# Patient Record
Sex: FEMALE | Race: WHITE | NOT HISPANIC OR LATINO | Employment: FULL TIME
[De-identification: names, ages, dates, MRNs, and addresses within clinical notes are randomized per-mention and may not be internally consistent; named-entity substitution may affect disease eponyms.]

---

## 2024-08-13 ENCOUNTER — TRANSCRIBE ORDERS (OUTPATIENT)
Dept: OTHER | Age: 44
End: 2024-08-13

## 2024-08-13 DIAGNOSIS — D48.60 PHYLLODES TUMOR OF BREAST: Primary | ICD-10-CM

## 2024-08-14 ENCOUNTER — PATIENT OUTREACH (OUTPATIENT)
Dept: ONCOLOGY | Facility: CLINIC | Age: 44
End: 2024-08-14
Payer: COMMERCIAL

## 2024-08-14 NOTE — PROGRESS NOTES
New Patient Oncology Nurse Navigator Note     Referring provider: Xenia Cifuentes MD      Referring Clinic/Organization: [facility]      Referred to (specialty:) Medical Oncology     Requested provider (if applicable): Dr. 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 Bilateral diagnostic mammograms were performed on 5/10/24   Right: New irregular, mixed density mass upper outer quadrant of the right breast at middle and posterior depth. Mammographically, the mass measures 6.6 x 5.4 x 5.8 cm. This contains heterogeneously dense portions of the tissue, but also is interspersed with areas of fat.   Ultrasound pursued.   Left: There is no evidence for spiculated masses, architectural  distortion, asymmetry or suspicious calcifications.   Ultrasound evaluation of the right breast was performed. Palpable abnormality corresponds with a mixed echogenicity mass 9-10:00 position at 6 cm from the nipple. Sonographically this measures 6.1 x 3.1 x 4.1 cm. There are solid components as well as mixed cystic/solid components. Posteriorly within the mass there are echogenic areas which likely represent areas of intralesional fat.   IMPRESSION: Highly suspicious palpable right breast mass, measuring up to 6.6 cm.     5/13/24 - Case: [id number]     A) RIGHT BREAST, 9:00, 6 CM FROM NIPPLE, ULTRASOUND-GUIDED CORE BIOPSY:  1. Phyllodes tumor, at least borderline, with features concerning for malignancy  2. See comment   Comment A) Microscopic review shows a fibroepithelial lesion that displays the following: stromal overgrowth (in relationship to the glandular elements), stromal hypercellularity, stromal atypia, and an atypical lipomatous  "component. These findings are classified at least as a borderline phyllodes tumor (low-grade malignant) and raise the possibility of malignancy.    Phyllodes tumors that are classified as \"borderline type\" can recur (14-25% risk of recurrence), and rarely have the potential to metastasize. Thus, complete excision with an adequate margin is the recommended treatment.    This is an image-guided breast biopsy. The pathologic findings should be correlated with radiologic and clinical findings prior to treatment decisions.    She met with Dr. Shavonne Lott for surgery consult.    6/4/24 - Bilateral breast MRI  IMPRESSION:   1.  Biopsy-proven phyllodes tumor in the right breast measures up to 7.0 cm (on ultrasound this measured up to 6.1 cm). No suspicious enhancement of the overlying skin or the underlying pectoralis muscle.   2.  No MRI evidence of malignancy in the left breast.   3.  No suspicious lymph nodes.   ACR BI-RADS Category: 4 - Suspicious Abnormality.   RECOMMENDATION: Surgical excision    7/23/24 - Case: [id number]             A) RIGHT BREAST, LUMPECTOMY:   1. Malignant phyllodes tumor, measuring 78 mm (see comment)       a. Margins:          - Less than 1 mm from the anterior, posterior, inferior, superior and medial margins          - See parts B-G for final margin status       b. Biopsy clip identified   2. See synoptic staging summary below     B) RIGHT BREAST, ANTERIOR MARGIN, EXCISION:   1. Benign breast tissue   2. Negative for atypia and malignancy (margin negative)     C) RIGHT BREAST, POSTERIOR MARGIN, EXCISION:   1. Benign breast tissue   2. Focal benign skeletal muscle   3. Negative for atypia and malignancy (margin negative)     D) RIGHT BREAST, MEDIAL MARGIN, EXCISION:   1. Benign breast tissue   2. Negative for atypia and malignancy (margin negative)     E) RIGHT BREAST, LATERAL MARGIN, EXCISION:   1. Benign breast tissue   2. Focal benign skeletal muscle   3. Negative for atypia and " "malignancy (margin negative)     F) RIGHT BREAST, INFERIOR MARGIN, EXCISION:   1. Benign breast tissue   2. Negative for atypia and malignancy (margin negative)     G) RIGHT BREAST, SUPERIOR MARGIN, EXCISION:   1. Benign breast tissue   2. Negative for atypia and malignancy (margin negative)     Comment:  A) Microscopic review shows a fibroepithelial lesion that marked stromal cellularity, severe nuclear atypia, stromal overgrowth, infiltrative border, areas of leaf-like architecture, numerous mitoses and a malignant heterologous (liposarcoma) component. Overall, these findings are consistent with a malignant phyllodes tumor.     Phyllodes tumors that are classified as \"malignant type\" can recur (23-30% risk of recurrence), and have the potential to metastasize. Thus, complete excision is the recommended treatment (in the current case, all final margins are negative; the final posterior margin specimen (part C) was 8 mm thick and would be the closest final margin. All remaining final margins are at least 10 mm away.). An ideal margin width for malignant phyllodes tumors remains to be determined (and should be considered in relation to factors such as tumor size and cosmesis). Although some studies have found increased incidence of recurrence in patients with margins less than 10 mm, other studies have not shown that the margin width confers local control advantage.     8/5/24 - Case: [id number]                                   A) BREAST, RIGHT, ORIENTED ADDITIONAL POSTERIOR MARGIN, RE-EXCISION:  1. Breast tissue and skeletal muscle with reactive changes including fat necrosis, inflammation and fibrosis  2. Negative for phyllodes tumor  3. See comment   Comment With this additional posterior margin re-excision, all final margins are negative for phyllodes tumor by > 10 mm.     Jyoti had a consultation with rad onc and thinking about radiation. [facility]  RADIATION ONCOLOGY CONSULT AND PROGRESS NOTES - Scan not " transferring to Media?    Patient resides in [place], [place].    8/14 1328 - Telephoned and left voice message for patient requesting call to New Patient Scheduling to assist in scheduling consult as referred by Dr. Cifuentes. Writer received referral, reviewed for appropriate plan, and referral transferred to New Patient Scheduling for completion.    8/19 1515 - Telephoned and spoke with Jyoti. Explained my role and purpose for the call. Writer received referral, reviewed for appropriate plan, and call warm transferred to New Patient Scheduling for completion.    Records Location: Care Everywhere, Media, and See Bookmarked material     Records Needed:  Path reports-biopsy and surgery (per protocol)  Pathology reviews (per protocol)  Breast imaging for past 5 years  Rad notes, OP note, surgeons note (per protocol)

## 2024-08-22 NOTE — PROGRESS NOTES
Oncology Consultation:  Date on this visit: 8/26/2024    Jyoti Jurado is referred by Dr.Sarah KATELYN Cifuentes for an oncology consultation. She requires evaluation for new diagnosis of phyllodes tumor of the right breast.    Primary Physician: Shirin Carter     History Of Present Illness:  Ms. Jurado is a 44 year old female with right breast malignant phyllodes tumor.  She first palpated a small mass in her right breast when weaning her son from breastfeeding in 2020.  She had breast imaging in 05/2023 which showed dense breast tissue, but no suspicious findings.  Between 05/2023 and 05/2024, she noted the mass significantly increased in size.  She also developed numbness of the area over the mass.  Bilateral diagnostic mammograms on 5/10/2024 showed a large mass in the upper outer right breast.  Ultrasound showed the mass at 9-10 o'clock, 6 cm from the nipple to measure up to 6.1 cm.  Biopsy on 5/13/2024 was consistent with a phyllodes tumor with features suspicious for malignancy (stromal overgrowth, hypercellularity, atypia, and an atypical lipomatous component).  She has seen Dr. Shavonne Lott in surgical consultation.  A breast MRI showed the right breast phyllodes tumor to measure up to 7 cm.  There were no other suspicious breast lesions or lymphadenopathy.    She underwent a right breast lumpectomy on 7/23/2024.  Pathology was consistent with malignant phyllodes tumor measuring 7.8 cm.  All initial surgical margins were close at < 1 mm, patient intraoperatively underwent re-excision of anterior, posterior, medial, lateral, inferior, and superior margins which were all negative for residual disease.  The final closest margin, the posterior margin, was 8 mm.  She underwent re-excision of this margin on 8/5/2024 which was negative for residual disease.  All final margins were therefore negative by >10 mm.  Patient was seen by [facility] and is considering radiation.    Ms. Jurado today  "presents for a second opinion regarding adjuvant therapies for malignant Phyllodes tumor of the right breast. She notes surgery went okay.  She reports having had a lot of output from her right mastectomy drain initially, but this tapered off and she was able to have the drain removed 1 week ago.  Since having the drain removed, there is still a small amount of fluid intermittently draining from the drain insertion site.  She notes she can feel some swelling under the skin in this area as well.  No redness, warmth, fever, or chills.  She has ongoing right chest wall discomfort.  She is weaning off of ibuprofen.  In addition, she has had random \"firing\" pains for which she is taking amitriptyline and gabapentin.  She notes prior to surgery she had left shoulder capsulitis, post-surgery the right shoulder is also bothering her.  She is to return to work as a  tomorrow and is uncertain if she is ready to do so.    She is otherwise in relatively good health.  She reports chronic IBS with constipation.  She has a history of depression and anxiety for which she takes buspirone and escitalopram.  She feels mood has been stable on these medications.  She has menorrhagia, which is controlled with an IUD.  Outside of the bilateral shoulder capsulitis and right chest wall pain, she denies new bone or joint aches or pains.  She has no cough or shortness of breath.      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 Blood Product orders may be delayed.  Draw one red top and two purple top tubes for ALL Type and Screen/ Type and Crossmatch orders.    Tetracyclines & Related Other (See Comments)     recurrent yeast infections    Other " Reaction(s): Yeast Infection      recurrent yeast infections    Anesthetics, Amide Rash     Swelling also    Latex Dermatitis, Itching, Rash and Swelling     Swelling also    Paroxetine Anxiety, Confusion, Dizziness, Other (See Comments), Palpitations and Rash     Other Reaction(s): Lightheadedness    Manic type symptoms, increased mood swings    Povidone-Iodine Dermatitis, Itching, Rash and Swelling        Current Medications:    Current Outpatient Medications:     amitriptyline (ELAVIL) 10 MG tablet, Take 1 tablet by mouth daily., Disp: , Rfl:     busPIRone (BUSPAR) 10 MG tablet, Take 10 mg by mouth 2 times daily., Disp: , Rfl:     Cholecalciferol (D 1000) 25 MCG (1000 UT) CAPS, Take by mouth daily., Disp: , Rfl:     escitalopram (LEXAPRO) 20 MG tablet, Take 20 mg by mouth daily., Disp: , Rfl:     gabapentin (NEURONTIN) 100 MG capsule, Take 100 mg by mouth 3 times daily., Disp: , Rfl:     levonorgestrel (MIRENA) 52 MG (20 mcg/day) IUD, 1 each by Intrauterine route., Disp: , Rfl:     magnesium oxide 400 MG CAPS, Take 400 mg by mouth daily., Disp: , Rfl:     Melatonin 10 MG TABS tablet, Take 10 mg by mouth nightly as needed., Disp: , Rfl:     multivitamin w/minerals (MULTI-VITAMIN) tablet, Take 1 tablet by mouth daily., Disp: , Rfl:     mupirocin (BACTROBAN) 2 % external ointment, Apply topically daily., Disp: , Rfl:     thyroid (ARMOUR) 60 MG tablet, Take 60 mg by mouth daily., Disp: , Rfl:       Family History:  Paternal aunt with breast cancer at 51 yo.  Paternal first cousin (daughter of aforementioned aunt) with breast cancer at 47 yo.  Grandfather with prostate cancer at 81 yo.  Great aunt with breast cancer in her 50s.  Patient states she had germline genetic testing performed through [facility] which was negative for pathogenic mutation.    Social History:  .  Has 2 children, ages 10 and 7 years old.  She states one of her children has a learning disability and the other in on the autism spectrum.  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  She is a nonsmoker.  Occasional alcohol use.    Physical Exam:  /85 (BP Location: Right arm, Patient Position: Sitting, Cuff Size: Adult Regular)   Pulse 74   Temp 98.6  F (37  C) (Oral)   Resp 14   Ht 1.642 m (5' 4.65\")   Wt 82.9 kg (182 lb 12.8 oz)   SpO2 97%   BMI 30.75 kg/m    General:  Well appearing adult female in NAD.  HEENT:  Normocephalic.  Sclera anicteric.  MMM.    Lymph:  No palpable cervical, supraclavicular, or axillary LAD.  Chest:  CTA bilaterally.  No wheezes or crackles.  CV:  RRR.  Nl S1 and S2.  No audible m/r/g.  Breast:  Bilateral breasts are of increased fibroglandular density. Right breast upper outer lumpectomy.  There is skin thickening anterolateral right chest wall in the area between the lumpectomy incision and the drain site.  No fluctuant fluid.  No masses or nodularity of the either breast.  Abd:  Soft/ND.    Ext:  No pitting edema of the bilateral lower extremities.    Neuro:  Cranial nerves grossly intact.  Psych:  Mood and affect appear normal.      Laboratory/Imaging Studies  I personally reviewed the below images and laboratories while in clinic today:    5/10/2024 Bilateral diagnostic mammograms and right breast ultrasound:  BILATERAL Digital Diagnostic Mammogram     FINDINGS: A diagnostic bilateral mammogram was performed utilizing   tomosynthesis.     Right: New irregular, mixed density mass upper outer quadrant of the   right breast at middle and posterior depth. Mammographically, the   mass measures 6.6 x 5.4 x 5.8 cm. This contains heterogeneously dense   portions of the tissue, but also is interspersed with areas of fat.   Ultrasound pursued.     Left: There is no evidence for spiculated masses, architectural   distortion, asymmetry or suspicious calcifications.     When performed, computer-aided detection was used in the   interpretation of this study.       RIGHT Breast Ultrasound     FINDINGS: " Ultrasound evaluation of the right breast was performed.   Palpable abnormality corresponds with a mixed echogenicity mass   9-10:00 position at 6 cm from the nipple. Sonographically this   measures 6.1 x 3.1 x 4.1 cm. There are solid components as well as   mixed cystic/solid components. Posteriorly within the mass there are   echogenic areas which likely represent areas of intralesional fat.     IMPRESSION: Highly suspicious palpable right breast mass, measuring   up to 6.6 cm.     5/13/2024 Right breast biopsy:  Final Diagnosis A) RIGHT BREAST, 9:00, 6 CM FROM NIPPLE, ULTRASOUND-GUIDED CORE BIOPSY:  1. Phyllodes tumor, at least borderline, with features concerning for malignancy  2. See comment   Electronically signed by Jeanne Schrader MD for Maria G Narayan MD on 5/15/2024 at 11:39 AM   Comment A) Microscopic review shows a fibroepithelial lesion that displays the following: stromal overgrowth (in relationship to the glandular elements), stromal hypercellularity, stromal atypia, and an atypical lipomatous component. These findings are classified at least as a borderline phyllodes tumor (low-grade malignant) and raise the possibility of malignancy.     6/4/2024 Breast MRI:  FINDINGS:   Breast composition: Heterogeneous fibroglandular tissue   Background parenchymal enhancement: Minimal       Right Breast:   In the upper outer quadrant and extending into the slightly lower   outer quadrant, middle and posterior depths there is an irregular   mass with circumscribed margins which corresponds with the   biopsy-proven phyllodes tumor. The mass measures 7.0 x 5.0 x 7.0 cm   (AP by TV by CC dimensions measured on axial subtraction 100: Image   58 and sagittal 101:69). This is predominantly solid and   heterogeneously enhancing. Much of the mass is T2 hyperintense and   avidly enhances. A small portion anteriorly is T2 and T1 hyperintense   and does not enhance, probably represents  proteinaceous/hemorrhagic   fluid. The mass broadly abuts the pectoralis muscle without findings   to suggest muscle invasion. A thin layer (less than 1 cm in   thickness) of normal fat is preserved between the mass and overlying   skin of the lateral breast, no suspicious enhancement of the   overlying skin.     No suspicious enhancement elsewhere in the right breast.     No right axillary or internal mammary chain adenopathy. No chest wall   signal abnormalities.       Left Breast:   No concerning areas of enhancement or suspicious masses.     No left axillary or internal mammary chain adenopathy. No chest wall   signal abnormalities.     7/23/2024 Pathology right breast lumpectomy:  Final Diagnosis A) RIGHT BREAST, LUMPECTOMY:  1. Malignant phyllodes tumor, measuring 78 mm (see comment)      a. Margins:         - Less than 1 mm from the anterior, posterior, inferior, superior and medial margins         - See parts B-G for final margin status      b. Biopsy clip identified  2. See synoptic staging summary below    B) RIGHT BREAST, ANTERIOR MARGIN, EXCISION:  1. Benign breast tissue  2. Negative for atypia and malignancy (margin negative)    C) RIGHT BREAST, POSTERIOR MARGIN, EXCISION:  1. Benign breast tissue  2. Focal benign skeletal muscle  3. Negative for atypia and malignancy (margin negative)    D) RIGHT BREAST, MEDIAL MARGIN, EXCISION:  1. Benign breast tissue  2. Negative for atypia and malignancy (margin negative)    E) RIGHT BREAST, LATERAL MARGIN, EXCISION:  1. Benign breast tissue  2. Focal benign skeletal muscle  3. Negative for atypia and malignancy (margin negative)    F) RIGHT BREAST, INFERIOR MARGIN, EXCISION:  1. Benign breast tissue  2. Negative for atypia and malignancy (margin negative)    G) RIGHT BREAST, SUPERIOR MARGIN, EXCISION:  1. Benign breast tissue  2. Negative for atypia and malignancy (margin negative)   Electronically signed by Angie Luna MD on 7/26/2024 at  4:37 PM  "  Comment A) Microscopic review shows a fibroepithelial lesion that marked stromal cellularity, severe nuclear atypia, stromal overgrowth, infiltrative border, areas of leaf-like architecture, numerous mitoses and a malignant heterologous (liposarcoma) component. Overall, these findings are consistent with a malignant phyllodes tumor.    Phyllodes tumors that are classified as \"malignant type\" can recur (23-30% risk of recurrence), and have the potential to metastasize. Thus, complete excision is the recommended treatment (in the current case, all final margins are negative; the final posterior margin specimen (part C) was 8 mm thick and would be the closest final margin. All remaining final margins are at least 10 mm away.). An ideal margin width for malignant phyllodes tumors remains to be determined (and should be considered in relation to factors such as tumor size and cosmesis). Although some studies have found increased incidence of recurrence in patients with margins less than 10 mm, other studies have not shown that the margin width confers local control advantage.     A) Immunohistochemical stains were performed on blocks A12 and A14 with appropriate controls and show the following results, supporting the diagnosis:   Block A12:       CKMNF-116: Negative       CKAE1/AE3: Negative       CKCocktail: Negative       Melan-A: Negative       HMB-45: Negative       SMA: Negative       Desmin: Negative       S-100: Positive in lipomatous component       ERG: Negative (highlights vessels)   Block A14:       CD34: Negative (highlights vessels)       CD10: Negative       SMA: Negative     SYNOPTIC REPORTING PHYLLODES OF THE BREAST: Resection  PHYLLODES OF THE BREAST: RESECTION - All Specimens  8th Edition - Protocol posted: 9/21/2022    SPECIMEN     Procedure:    Excision (less than total mastectomy)     Specimen Laterality:    Right    TUMOR     Tumor Site:    Clock position     :    9 o'clock   Tumor Site:    " Distance from nipple (Centimeters): 6 cm   Tumor Size:    Greatest dimension (Millimeters): 78 mm   Histologic Type:    Phyllodes tumor, malignant   Stromal Cellularity:    Marked   Stromal Atypia:    Marked   Stromal Overgrowth:    Present   Mitotic Rate:    > 100 mitoses per 10 HPFs   Histologic Tumor Border:    Infiltrative     :    Extensive   Malignant Heterologous Elements:    Liposarcoma (excluding well-differentiated liposarcoma)    MARGINS   Margin Status for Phyllodes Tumor:    All margins negative for phyllodes tumor     Closest Margin(s) to Phyllodes Tumor:    Posterior     Distance from Phyllodes Tumor to Closest Margin:    At least 8 mm    REGIONAL LYMPH NODES   Regional Lymph Node Status:    Not applicable (no regional lymph nodes submitted or found)       Staging applies only to malignant phyllodes tumors. pT and pN categories should not be assigned for benign and borderline tumors.   PATHOLOGIC STAGE CLASSIFICATION (pTNM, AJCC 8th Edition):    Tumor is malignant     pT Category:    pT2     pN Category:    pN not assigned (no nodes submitted or found)     Comment(s):    Block(s) for potential future ancillary testing: A12     8/5/2024 Right breast, posterior margin re-excision:  Final Diagnosis A) BREAST, RIGHT, ORIENTED ADDITIONAL POSTERIOR MARGIN, RE-EXCISION:  1. Breast tissue and skeletal muscle with reactive changes including fat necrosis, inflammation and fibrosis  2. Negative for phyllodes tumor  3. See comment [facility]   Electronically signed by Abrhaam Stokes MD on 8/8/2024 at  2:12 PM    Comment With this additional posterior margin re-excision, all final margins are negative for phyllodes tumor by > 10 mm.      8/6/2024 CT chest w/ and w/o contrast:  FINDINGS:   LUNGS AND PLEURA: Small stable calcification posterior lateral right   lower lobe may represent a granuloma or a small area of vascular   calcification. The lungs are otherwise clear. No  pleural effusion.     MEDIASTINUM/AXILLAE: Postoperative changes right breast with   percutaneous surgical drain in place. There is some overlying skin   thickening and mild haziness of the fat along the operative bed. No   defined fluid collections are evident. No lymphadenopathy. No   significant pericardial fluid. Normal caliber thoracic aorta and   esophagus.     CORONARY ARTERY CALCIFICATION: None.     UPPER ABDOMEN: Food material in the stomach, likely nonfasting at the   time of this exam. No significant findings otherwise identified.     MUSCULOSKELETAL: No significant osseous pathology.       ASSESSMENT/PLAN:  Ms. 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  She is s/p surgical excision with wide surgical margins.  She has met in consultation with radiation oncology.  She comes into clinic today for a second opinion regarding adjuvant treatment options.    We discussed her diagnosis of malignant Phyllodes tumor of the right breast.  We discussed Phyllodes tumor of the breast is not a breast cancer, but rather a sarcoma as it arises from the connective tissue of the breast.  Like other sarcomas, Phyllodes tumors have a high risk of local recurrence.  In other words, there is a high risk of recurrence at the initial site of presentation.  Due to this, the mainstay of treatment of malignant Phyllodes tumor of the breast is excision with wide surgical margins of >10 mm.  She is s/p right breast lumpectomy and re-excision of margins, with all final margins >10 mm.  Therefore no further surgery is indicated at this point.    We next reviewed adjuvant therapy treatment options.  National Comprehensive Cancer Network guidelines recommend consideration of adjuvant radiation therapy in patients considered at high risk of recurrence.  High risk of recurrence features in this patient include tumor size >5 cm and high mitotic index.  I therefore  would consider adjuvant radiation in this case, however, would also like to discuss this with my radiation oncology colleagues and so have added on Ms. Jurado's case for discussion at the [facility] tumor board on Friday, 8/30/2024.      We discussed there is no literature to support a benefit of adjuvant chemotherapy in the treatment of malignant Phyllodes tumor of the breast.  Given the high mitotic index of the Phyllodes tumor in this case, I discussed this case with my sarcoma expert colleagues, Dr. Major and Dr. Rossi.  Both agreed that they also would not recommend adjuvant chemotherapy given no evidence for benefit and high toxicity to a treatment regimen such as Doxil and ifosfamide.    We then discussed surveillance plan.  Given her young age, family history of breast cancer, and dense breast tissue, I recommend high risk screening of her breasts with both an annual mammogram w/ tomosynthesis as well as an annual breast MRI.  These studies can be spaced so that she is having some form of breast imaging once every 6 months.  In addition, given the most common site of metastasis from malignant Phyllodes tumor of the breast is the lungs, I recommend a CT of the chest every 6-12 months for the next 5 years.    Ms. Jurado had multiple questions that were answered to her satisfaction today.  She is agreeable to the above plan.  The [facility] is outside of her insurance network, she therefore plans to resume oncologic care at [facility].  I asked that she not hesitate to send any questions she has via [employer] or to call our clinic.    Plan:  - Case to be presented at the [facility] tumor board on 8/30/2024 to discuss adjuvant radiation  - No known benefit to adjuvant chemotherapy in the treatment of malignant Phyllodes tumor of the breast  - CT chest every 6-12 months x 5 years.  - High risk breast screening with both annual mammogram and breast MRI, alternating studies so that  she is having breast imaging once every 6 months.    I spent 70 minutes on the date of the encounter doing chart review, review of outside records, review of test results, interpretation of tests, patient visit, documentation, and discussion with other provider(s).

## 2024-08-26 ENCOUNTER — PRE VISIT (OUTPATIENT)
Dept: ONCOLOGY | Facility: CLINIC | Age: 44
End: 2024-08-26
Payer: COMMERCIAL

## 2024-08-26 ENCOUNTER — ONCOLOGY VISIT (OUTPATIENT)
Dept: ONCOLOGY | Facility: CLINIC | Age: 44
End: 2024-08-26
Attending: INTERNAL MEDICINE
Payer: COMMERCIAL

## 2024-08-26 VITALS
SYSTOLIC BLOOD PRESSURE: 130 MMHG | BODY MASS INDEX: 30.46 KG/M2 | HEIGHT: 65 IN | WEIGHT: 182.8 LBS | HEART RATE: 74 BPM | OXYGEN SATURATION: 97 % | DIASTOLIC BLOOD PRESSURE: 85 MMHG | TEMPERATURE: 98.6 F | RESPIRATION RATE: 14 BRPM

## 2024-08-26 DIAGNOSIS — F41.1 ANXIETY ASSOCIATED WITH CANCER DIAGNOSIS (H): ICD-10-CM

## 2024-08-26 DIAGNOSIS — C80.1 ANXIETY ASSOCIATED WITH CANCER DIAGNOSIS (H): ICD-10-CM

## 2024-08-26 DIAGNOSIS — C50.911 MALIGNANT PHYLLODES TUMOR OF BREAST, RIGHT (H): Primary | ICD-10-CM

## 2024-08-26 DIAGNOSIS — N64.89 BREAST EDEMA: ICD-10-CM

## 2024-08-26 PROCEDURE — 99205 OFFICE O/P NEW HI 60 MIN: CPT | Performed by: INTERNAL MEDICINE

## 2024-08-26 PROCEDURE — 99213 OFFICE O/P EST LOW 20 MIN: CPT | Performed by: INTERNAL MEDICINE

## 2024-08-26 RX ORDER — MULTIVITAMIN
1 TABLET ORAL DAILY
COMMUNITY

## 2024-08-26 RX ORDER — MUPIROCIN 20 MG/G
OINTMENT TOPICAL DAILY
COMMUNITY
Start: 2024-08-02

## 2024-08-26 RX ORDER — AMPICILLIN TRIHYDRATE 500 MG
CAPSULE ORAL DAILY
COMMUNITY

## 2024-08-26 RX ORDER — BUSPIRONE HYDROCHLORIDE 10 MG/1
10 TABLET ORAL 2 TIMES DAILY
COMMUNITY
Start: 2019-08-25

## 2024-08-26 RX ORDER — ESCITALOPRAM OXALATE 20 MG/1
20 TABLET ORAL DAILY
COMMUNITY
Start: 2019-08-25

## 2024-08-26 RX ORDER — GABAPENTIN 100 MG/1
100 CAPSULE ORAL 3 TIMES DAILY
COMMUNITY
Start: 2024-08-02 | End: 2024-11-17

## 2024-08-26 RX ORDER — CALCIUM CARBONATE/VITAMIN D3 500-10/5ML
400 LIQUID (ML) ORAL DAILY
COMMUNITY

## 2024-08-26 RX ORDER — THYROID 60 MG/1
60 TABLET ORAL DAILY
COMMUNITY
Start: 2024-03-01

## 2024-08-26 RX ORDER — AMITRIPTYLINE HYDROCHLORIDE 10 MG/1
1 TABLET ORAL DAILY
COMMUNITY
Start: 2024-08-02 | End: 2024-09-18

## 2024-08-26 RX ORDER — PHENOL 1.4 %
10 AEROSOL, SPRAY (ML) MUCOUS MEMBRANE
COMMUNITY

## 2024-08-26 ASSESSMENT — PAIN SCALES - GENERAL: PAINLEVEL: MILD PAIN (3)

## 2024-08-26 NOTE — LETTER
8/26/2024      Jyoti Jurado  [address] [address] [place] [id number]      Dear Colleague,    Thank you for referring your patient, Jyoti Jurado, to the [facility]. Please see a copy of my visit note below.    Oncology Consultation:  Date on this visit: 8/26/2024    Jyoti Jurado is referred by Dr.Sarah KATELYN Cifuentes for an oncology consultation. She requires evaluation for new diagnosis of phyllodes tumor of the right breast.    Primary Physician: Shirin Carter     History Of Present Illness:  Ms. Jurado is a 44 year old female with right breast malignant phyllodes tumor.  She first palpated a small mass in her right breast when weaning her son from breastfeeding in 2020.  She had breast imaging in 05/2023 which showed dense breast tissue, but no suspicious findings.  Between 05/2023 and 05/2024, she noted the mass significantly increased in size.  She also developed numbness of the area over the mass.  Bilateral diagnostic mammograms on 5/10/2024 showed a large mass in the upper outer right breast.  Ultrasound showed the mass at 9-10 o'clock, 6 cm from the nipple to measure up to 6.1 cm.  Biopsy on 5/13/2024 was consistent with a phyllodes tumor with features suspicious for malignancy (stromal overgrowth, hypercellularity, atypia, and an atypical lipomatous component).  She has seen Dr. Shavonne Lott in surgical consultation.  A breast MRI showed the right breast phyllodes tumor to measure up to 7 cm.  There were no other suspicious breast lesions or lymphadenopathy.    She underwent a right breast lumpectomy on 7/23/2024.  Pathology was consistent with malignant phyllodes tumor measuring 7.8 cm.  All initial surgical margins were close at < 1 mm, patient intraoperatively underwent re-excision of anterior, posterior, medial, lateral, inferior, and superior margins which were all negative for residual disease.  The final closest margin, the posterior margin, was 8 mm.  She underwent  "re-excision of this margin on 8/5/2024 which was negative for residual disease.  All final margins were therefore negative by >10 mm.  Patient was seen by [facility] and is considering radiation.    Ms. Jurado today presents for a second opinion regarding adjuvant therapies for malignant Phyllodes tumor of the right breast. She notes surgery went okay.  She reports having had a lot of output from her right mastectomy drain initially, but this tapered off and she was able to have the drain removed 1 week ago.  Since having the drain removed, there is still a small amount of fluid intermittently draining from the drain insertion site.  She notes she can feel some swelling under the skin in this area as well.  No redness, warmth, fever, or chills.  She has ongoing right chest wall discomfort.  She is weaning off of ibuprofen.  In addition, she has had random \"firing\" pains for which she is taking amitriptyline and gabapentin.  She notes prior to surgery she had left shoulder capsulitis, post-surgery the right shoulder is also bothering her.  She is to return to work as a  tomorrow and is uncertain if she is ready to do so.    She is otherwise in relatively good health.  She reports chronic IBS with constipation.  She has a history of depression and anxiety for which she takes buspirone and escitalopram.  She feels mood has been stable on these medications.  She has menorrhagia, which is controlled with an IUD.  Outside of the bilateral shoulder capsulitis and right chest wall pain, she denies new bone or joint aches or pains.  She has no cough or shortness of breath.      Past Medical/Surgical History:  Malignant phyllodes tumor of the right breast as per HPI  Irritable bowel syndrome  Insomnia  H/o dysplastic nevus  Menometrorrhagia  Depression  Hashimoto's thyroiditis    Allergies:     Allergies   Allergen Reactions     Blood-Group Specific Substance Unknown     Patient has a probable " passive Anti-D. Blood Product orders may be delayed.  Draw one red top and two purple top tubes for ALL Type and Screen/ Type and Crossmatch orders.     Tetracyclines & Related Other (See Comments)     recurrent yeast infections    Other Reaction(s): Yeast Infection      recurrent yeast infections     Anesthetics, Amide Rash     Swelling also     Latex Dermatitis, Itching, Rash and Swelling     Swelling also     Paroxetine Anxiety, Confusion, Dizziness, Other (See Comments), Palpitations and Rash     Other Reaction(s): Lightheadedness    Manic type symptoms, increased mood swings     Povidone-Iodine Dermatitis, Itching, Rash and Swelling        Current Medications:    Current Outpatient Medications:      amitriptyline (ELAVIL) 10 MG tablet, Take 1 tablet by mouth daily., Disp: , Rfl:      busPIRone (BUSPAR) 10 MG tablet, Take 10 mg by mouth 2 times daily., Disp: , Rfl:      Cholecalciferol (D 1000) 25 MCG (1000 UT) CAPS, Take by mouth daily., Disp: , Rfl:      escitalopram (LEXAPRO) 20 MG tablet, Take 20 mg by mouth daily., Disp: , Rfl:      gabapentin (NEURONTIN) 100 MG capsule, Take 100 mg by mouth 3 times daily., Disp: , Rfl:      levonorgestrel (MIRENA) 52 MG (20 mcg/day) IUD, 1 each by Intrauterine route., Disp: , Rfl:      magnesium oxide 400 MG CAPS, Take 400 mg by mouth daily., Disp: , Rfl:      Melatonin 10 MG TABS tablet, Take 10 mg by mouth nightly as needed., Disp: , Rfl:      multivitamin w/minerals (MULTI-VITAMIN) tablet, Take 1 tablet by mouth daily., Disp: , Rfl:      mupirocin (BACTROBAN) 2 % external ointment, Apply topically daily., Disp: , Rfl:      thyroid (ARMOUR) 60 MG tablet, Take 60 mg by mouth daily., Disp: , Rfl:       Family History:  Paternal aunt with breast cancer at 49 yo.  Paternal first cousin (daughter of aforementioned aunt) with breast cancer at 49 yo.  Grandfather with prostate cancer at 79 yo.  Great aunt with breast cancer in her 50s.  Patient states she had germline genetic  "testing performed through [employer] which was negative for pathogenic mutation.    Social History:  .  Has 2 children, ages 10 and 7 years old.  She states one of her children has a learning disability and the other in on the autism spectrum.  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  She is a nonsmoker.  Occasional alcohol use.    Physical Exam:  /85 (BP Location: Right arm, Patient Position: Sitting, Cuff Size: Adult Regular)   Pulse 74   Temp 98.6  F (37  C) (Oral)   Resp 14   Ht 1.642 m (5' 4.65\")   Wt 82.9 kg (182 lb 12.8 oz)   SpO2 97%   BMI 30.75 kg/m    General:  Well appearing adult female in NAD.  HEENT:  Normocephalic.  Sclera anicteric.  MMM.    Lymph:  No palpable cervical, supraclavicular, or axillary LAD.  Chest:  CTA bilaterally.  No wheezes or crackles.  CV:  RRR.  Nl S1 and S2.  No audible m/r/g.  Breast:  Bilateral breasts are of increased fibroglandular density. Right breast upper outer lumpectomy.  There is skin thickening anterolateral right chest wall in the area between the lumpectomy incision and the drain site.  No fluctuant fluid.  No masses or nodularity of the either breast.  Abd:  Soft/ND.    Ext:  No pitting edema of the bilateral lower extremities.    Neuro:  Cranial nerves grossly intact.  Psych:  Mood and affect appear normal.      Laboratory/Imaging Studies  I personally reviewed the below images and laboratories while in clinic today:    5/10/2024 Bilateral diagnostic mammograms and right breast ultrasound:  BILATERAL Digital Diagnostic Mammogram     FINDINGS: A diagnostic bilateral mammogram was performed utilizing   tomosynthesis.     Right: New irregular, mixed density mass upper outer quadrant of the   right breast at middle and posterior depth. Mammographically, the   mass measures 6.6 x 5.4 x 5.8 cm. This contains heterogeneously dense   portions of the tissue, but also is interspersed with areas of fat.   Ultrasound pursued. "     Left: There is no evidence for spiculated masses, architectural   distortion, asymmetry or suspicious calcifications.     When performed, computer-aided detection was used in the   interpretation of this study.       RIGHT Breast Ultrasound     FINDINGS: Ultrasound evaluation of the right breast was performed.   Palpable abnormality corresponds with a mixed echogenicity mass   9-10:00 position at 6 cm from the nipple. Sonographically this   measures 6.1 x 3.1 x 4.1 cm. There are solid components as well as   mixed cystic/solid components. Posteriorly within the mass there are   echogenic areas which likely represent areas of intralesional fat.     IMPRESSION: Highly suspicious palpable right breast mass, measuring   up to 6.6 cm.     5/13/2024 Right breast biopsy:  Final Diagnosis A) RIGHT BREAST, 9:00, 6 CM FROM NIPPLE, ULTRASOUND-GUIDED CORE BIOPSY:  1. Phyllodes tumor, at least borderline, with features concerning for malignancy  2. See comment   Electronically signed by Jeanne Schrader MD for Maria G Narayan MD on 5/15/2024 at 11:39 AM   Comment A) Microscopic review shows a fibroepithelial lesion that displays the following: stromal overgrowth (in relationship to the glandular elements), stromal hypercellularity, stromal atypia, and an atypical lipomatous component. These findings are classified at least as a borderline phyllodes tumor (low-grade malignant) and raise the possibility of malignancy.     6/4/2024 Breast MRI:  FINDINGS:   Breast composition: Heterogeneous fibroglandular tissue   Background parenchymal enhancement: Minimal       Right Breast:   In the upper outer quadrant and extending into the slightly lower   outer quadrant, middle and posterior depths there is an irregular   mass with circumscribed margins which corresponds with the   biopsy-proven phyllodes tumor. The mass measures 7.0 x 5.0 x 7.0 cm   (AP by TV by CC dimensions measured on axial subtraction 100: Image   58 and  sagittal 101:69). This is predominantly solid and   heterogeneously enhancing. Much of the mass is T2 hyperintense and   avidly enhances. A small portion anteriorly is T2 and T1 hyperintense   and does not enhance, probably represents proteinaceous/hemorrhagic   fluid. The mass broadly abuts the pectoralis muscle without findings   to suggest muscle invasion. A thin layer (less than 1 cm in   thickness) of normal fat is preserved between the mass and overlying   skin of the lateral breast, no suspicious enhancement of the   overlying skin.     No suspicious enhancement elsewhere in the right breast.     No right axillary or internal mammary chain adenopathy. No chest wall   signal abnormalities.       Left Breast:   No concerning areas of enhancement or suspicious masses.     No left axillary or internal mammary chain adenopathy. No chest wall   signal abnormalities.     7/23/2024 Pathology right breast lumpectomy:  Final Diagnosis A) RIGHT BREAST, LUMPECTOMY:  1. Malignant phyllodes tumor, measuring 78 mm (see comment)      a. Margins:         - Less than 1 mm from the anterior, posterior, inferior, superior and medial margins         - See parts B-G for final margin status      b. Biopsy clip identified  2. See synoptic staging summary below    B) RIGHT BREAST, ANTERIOR MARGIN, EXCISION:  1. Benign breast tissue  2. Negative for atypia and malignancy (margin negative)    C) RIGHT BREAST, POSTERIOR MARGIN, EXCISION:  1. Benign breast tissue  2. Focal benign skeletal muscle  3. Negative for atypia and malignancy (margin negative)    D) RIGHT BREAST, MEDIAL MARGIN, EXCISION:  1. Benign breast tissue  2. Negative for atypia and malignancy (margin negative)    E) RIGHT BREAST, LATERAL MARGIN, EXCISION:  1. Benign breast tissue  2. Focal benign skeletal muscle  3. Negative for atypia and malignancy (margin negative)    F) RIGHT BREAST, INFERIOR MARGIN, EXCISION:  1. Benign breast tissue  2. Negative for atypia and  "malignancy (margin negative)    G) RIGHT BREAST, SUPERIOR MARGIN, EXCISION:  1. Benign breast tissue  2. Negative for atypia and malignancy (margin negative)   Electronically signed by Angie Luna MD on 7/26/2024 at  4:37 PM   Comment A) Microscopic review shows a fibroepithelial lesion that marked stromal cellularity, severe nuclear atypia, stromal overgrowth, infiltrative border, areas of leaf-like architecture, numerous mitoses and a malignant heterologous (liposarcoma) component. Overall, these findings are consistent with a malignant phyllodes tumor.    Phyllodes tumors that are classified as \"malignant type\" can recur (23-30% risk of recurrence), and have the potential to metastasize. Thus, complete excision is the recommended treatment (in the current case, all final margins are negative; the final posterior margin specimen (part C) was 8 mm thick and would be the closest final margin. All remaining final margins are at least 10 mm away.). An ideal margin width for malignant phyllodes tumors remains to be determined (and should be considered in relation to factors such as tumor size and cosmesis). Although some studies have found increased incidence of recurrence in patients with margins less than 10 mm, other studies have not shown that the margin width confers local control advantage.     A) Immunohistochemical stains were performed on blocks A12 and A14 with appropriate controls and show the following results, supporting the diagnosis:   Block A12:       CKMNF-116: Negative       CKAE1/AE3: Negative       CKCocktail: Negative       Melan-A: Negative       HMB-45: Negative       SMA: Negative       Desmin: Negative       S-100: Positive in lipomatous component       ERG: Negative (highlights vessels)   Block A14:       CD34: Negative (highlights vessels)       CD10: Negative       SMA: Negative     SYNOPTIC REPORTING PHYLLODES OF THE BREAST: Resection  PHYLLODES OF THE BREAST: RESECTION - All " Specimens  8th Edition - Protocol posted: 9/21/2022    SPECIMEN     Procedure:    Excision (less than total mastectomy)     Specimen Laterality:    Right    TUMOR     Tumor Site:    Clock position     :    9 o'clock   Tumor Site:    Distance from nipple (Centimeters): 6 cm   Tumor Size:    Greatest dimension (Millimeters): 78 mm   Histologic Type:    Phyllodes tumor, malignant   Stromal Cellularity:    Marked   Stromal Atypia:    Marked   Stromal Overgrowth:    Present   Mitotic Rate:    > 100 mitoses per 10 HPFs   Histologic Tumor Border:    Infiltrative     :    Extensive   Malignant Heterologous Elements:    Liposarcoma (excluding well-differentiated liposarcoma)    MARGINS   Margin Status for Phyllodes Tumor:    All margins negative for phyllodes tumor     Closest Margin(s) to Phyllodes Tumor:    Posterior     Distance from Phyllodes Tumor to Closest Margin:    At least 8 mm    REGIONAL LYMPH NODES   Regional Lymph Node Status:    Not applicable (no regional lymph nodes submitted or found)       Staging applies only to malignant phyllodes tumors. pT and pN categories should not be assigned for benign and borderline tumors.   PATHOLOGIC STAGE CLASSIFICATION (pTNM, AJCC 8th Edition):    Tumor is malignant     pT Category:    pT2     pN Category:    pN not assigned (no nodes submitted or found)     Comment(s):    Block(s) for potential future ancillary testing: A12     8/5/2024 Right breast, posterior margin re-excision:  Final Diagnosis A) BREAST, RIGHT, ORIENTED ADDITIONAL POSTERIOR MARGIN, RE-EXCISION:  1. Breast tissue and skeletal muscle with reactive changes including fat necrosis, inflammation and fibrosis  2. Negative for phyllodes tumor  3. See comment [facility]AL LABORATORY   Electronically signed by Abraham Stokes MD on 8/8/2024 at  2:12 PM    Comment With this additional posterior margin re-excision, all final margins are negative for phyllodes tumor by > 10 mm.       8/6/2024 CT chest w/ and w/o contrast:  FINDINGS:   LUNGS AND PLEURA: Small stable calcification posterior lateral right   lower lobe may represent a granuloma or a small area of vascular   calcification. The lungs are otherwise clear. No pleural effusion.     MEDIASTINUM/AXILLAE: Postoperative changes right breast with   percutaneous surgical drain in place. There is some overlying skin   thickening and mild haziness of the fat along the operative bed. No   defined fluid collections are evident. No lymphadenopathy. No   significant pericardial fluid. Normal caliber thoracic aorta and   esophagus.     CORONARY ARTERY CALCIFICATION: None.     UPPER ABDOMEN: Food material in the stomach, likely nonfasting at the   time of this exam. No significant findings otherwise identified.     MUSCULOSKELETAL: No significant osseous pathology.       ASSESSMENT/PLAN:  Ms. 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  She is s/p surgical excision with wide surgical margins.  She has met in consultation with radiation oncology.  She comes into clinic today for a second opinion regarding adjuvant treatment options.    We discussed her diagnosis of malignant Phyllodes tumor of the right breast.  We discussed Phyllodes tumor of the breast is not a breast cancer, but rather a sarcoma as it arises from the connective tissue of the breast.  Like other sarcomas, Phyllodes tumors have a high risk of local recurrence.  In other words, there is a high risk of recurrence at the initial site of presentation.  Due to this, the mainstay of treatment of malignant Phyllodes tumor of the breast is excision with wide surgical margins of >10 mm.  She is s/p right breast lumpectomy and re-excision of margins, with all final margins >10 mm.  Therefore no further surgery is indicated at this point.    We next reviewed adjuvant therapy treatment options.  [facility]  Cancer Network guidelines recommend consideration of adjuvant radiation therapy in patients considered at high risk of recurrence.  High risk of recurrence features in this patient include tumor size >5 cm and high mitotic index.  I therefore would consider adjuvant radiation in this case, however, would also like to discuss this with my radiation oncology colleagues and so have added on Ms. Jurado's case for discussion at the [facility] breast tumor board on Friday, 8/30/2024.      We discussed there is no literature to support a benefit of adjuvant chemotherapy in the treatment of malignant Phyllodes tumor of the breast.  Given the high mitotic index of the Phyllodes tumor in this case, I discussed this case with my sarcoma expert colleagues, Dr. Major and Dr. Rossi.  Both agreed that they also would not recommend adjuvant chemotherapy given no evidence for benefit and high toxicity to a treatment regimen such as Doxil and ifosfamide.    We then discussed surveillance plan.  Given her young age, family history of breast cancer, and dense breast tissue, I recommend high risk screening of her breasts with both an annual mammogram w/ tomosynthesis as well as an annual breast MRI.  These studies can be spaced so that she is having some form of breast imaging once every 6 months.  In addition, given the most common site of metastasis from malignant Phyllodes tumor of the breast is the lungs, I recommend a CT of the chest every 6-12 months for the next 5 years.    Ms. Jurado had multiple questions that were answered to her satisfaction today.  She is agreeable to the above plan.  The [facility] is outside of her insurance network, she therefore plans to resume oncologic care at [facility].  I asked that she not hesitate to send any questions she has via [employer] or to call our clinic.    Plan:  - Case to be presented at the [facility] breast tumor board on 8/30/2024 to discuss adjuvant radiation  - No  known benefit to adjuvant chemotherapy in the treatment of malignant Phyllodes tumor of the breast  - CT chest every 6-12 months x 5 years.  - High risk breast screening with both annual mammogram and breast MRI, alternating studies so that she is having breast imaging once every 6 months.    I spent 70 minutes on the date of the encounter doing chart review, review of outside records, review of test results, interpretation of tests, patient visit, documentation, and discussion with other provider(s).               Again, thank you for allowing me to participate in the care of your patient.        Sincerely,        Noy Daily MD

## 2024-08-26 NOTE — NURSING NOTE
"Oncology Rooming Note    August 26, 2024 11:09 AM   Jyoti Jurado is a 44 year old female who presents for:    Chief Complaint   Patient presents with    Oncology Clinic Visit     Phyllodes tumor of breast     Initial Vitals: /85 (BP Location: Right arm, Patient Position: Sitting, Cuff Size: Adult Regular)   Pulse 74   Temp 98.6  F (37  C) (Oral)   Resp 14   Ht 1.642 m (5' 4.65\")   Wt 82.9 kg (182 lb 12.8 oz)   SpO2 97%   BMI 30.75 kg/m   Estimated body mass index is 30.75 kg/m  as calculated from the following:    Height as of this encounter: 1.642 m (5' 4.65\").    Weight as of this encounter: 82.9 kg (182 lb 12.8 oz). Body surface area is 1.94 meters squared.  Mild Pain (3) Comment: Data Unavailable   No LMP recorded. (Menstrual status: IUD).  Allergies reviewed: Yes  Medications reviewed: Yes    Medications: Medication refills not needed today.  Pharmacy name entered into [employer]: [place], [place] - [address]     Frailty Screening:   Is the patient here for a new oncology consult visit in cancer care? 1. Yes. Over the past month, have you experienced difficulty or required a caregiver to assist with:   1. Balance, walking or general mobility (including any falls)? NO  2. Completion of self-care tasks such as bathing, dressing, toileting, grooming/hygiene?  NO  3. Concentration or memory that affects your daily life?  NO       Clinical concerns: Patient states no new concerns to discuss with provider.  Dr. Daily was NOT notified.      Renetta Aguilar EMT            "

## 2024-08-26 NOTE — TELEPHONE ENCOUNTER
RECORDS STATUS - BREAST    RECORDS REQUESTED FROM: [facility]    Imaging Received  August 26, 2024 10:01 AM    Action: Breast Images from [facility] received and email sent to  Imaging team to resolve breast images to PACS.        NOTES DETAILS STATUS   OFFICE NOTE from referring provider [facility] 8/7/24: Dr. Xenia Cifuentes   OFFICE NOTE from surgeon [facility] 8/3/24: Dr. Shavonne Lott   OFFICE NOTE from radiation oncologist External: [facility]   (Media Tab) 8/13/24: Dr. Joe Ness   OPERATIVE REPORT [facility] 8/5/24: Breast Re-Excision  7/23/24: Lumpectomy  5/13/24: US Breast Bx   MEDICATION LIST [facility]    LABS     PATHOLOGY REPORTS  (Tissue diagnosis, Stage, ER/RI percentage positive and intensity of staining, HER2 IHC, FISH, and all biopsies from breast and any distant metastasis)                 Report in [facility]  (Slides Requested) 8/5/24: [id number] (negative)  7/23/24: [id number] (positive)  5/1/24: [id number] (positive)   PATHOLOGY FEDEX TRACKING   [facility] Fedex Tracking #: [id number]   GENONOMIC TESTING     TYPE:   (Next Generation Sequencing, including Foundation One testing, and Oncotype score) External: [facility] 6/3/24: [id number]   IMAGING (NEED IMAGES & REPORT)     CT SCANS (Img req from [facility])  8/6/24: CT Chest   MRI (Img req from [facility])  6/4/24: MR Breast   MAMMO (Img req from [facility])  5/10/24, 5/1/23, 3/3/21: MA   ULTRASOUND (Img req from [facility]/ [facility])  [facility]:  8/13/24: US Axillary    [facility]:  5/13/24: US Breast Bx  5/10/24: US Breast  3/3/21: US Breast

## 2024-08-30 ENCOUNTER — TUMOR CONFERENCE (OUTPATIENT)
Dept: ONCOLOGY | Facility: CLINIC | Age: 44
End: 2024-08-30
Payer: COMMERCIAL

## 2024-08-30 NOTE — TUMOR CONFERENCE
Tumor Conference Information  Tumor Conference: Breast  Specialties Present: Medical oncology, Radiation oncology, Pathology, Radiology, Surgery, Genetics  Patient Status: Prospective  Stage: right breast malignant phyllodes tumor  Treatment to Date: Biopsy, Surgery  Clinical Trials: Not discussed  Genetic Testing Discussed/Recommended?: Already Completed  Supportive Care Services Discussed/Recommended?: No  Recommended Plan: Follows evidence-based guidelines (Comment: meet with radaition department for consideration of radiation)  Did the review exceed 30 minutes?: did not           Documentation / Disclaimer Cancer Tumor Board Note  Cancer tumor board recommendations do not override what is determined to be reasonable care and treatment, which is dependent on the circumstances of a patient's case; the patient's medical, social, and personal concerns; and the clinical judgment of the oncologist [physician].

## 2024-08-30 NOTE — TELEPHONE ENCOUNTER
Action August 30, 2024 11:08 AM ABT   Action Taken 110 Slides from Fidencio received and taken to 5th floor path lab for review.    07/23/24: [id number]  05/01/24: [id number]

## 2024-10-06 ENCOUNTER — HEALTH MAINTENANCE LETTER (OUTPATIENT)
Age: 44
End: 2024-10-06